# Patient Record
(demographics unavailable — no encounter records)

---

## 2025-04-07 NOTE — HEALTH RISK ASSESSMENT
[Very Good] : ~his/her~ current health as very good [Good] : ~his/her~  mood as  good [Yes] : Yes [2 - 4 times a month (2 pts)] : 2-4 times a month (2 points) [1 or 2 (0 pts)] : 1 or 2 (0 points) [No] : In the past 12 months have you used drugs other than those required for medical reasons? No [No falls in past year] : Patient reported no falls in the past year [0] : 2) Feeling down, depressed, or hopeless: Not at all (0) [PHQ-2 Negative - No further assessment needed] : PHQ-2 Negative - No further assessment needed [Alone] : lives alone [Employed] : employed [College] : College [Single] : single [Never] : Never [Never (0 pts)] : Never (0 points) [Patient reported mammogram was normal] : Patient reported mammogram was normal [Patient reported colonoscopy was normal] : Patient reported colonoscopy was normal [HIV test declined] : HIV test declined [Hepatitis C test declined] : Hepatitis C test declined [Fully functional (bathing, dressing, toileting, transferring, walking, feeding)] : Fully functional (bathing, dressing, toileting, transferring, walking, feeding) [Fully functional (using the telephone, shopping, preparing meals, housekeeping, doing laundry, using] : Fully functional and needs no help or supervision to perform IADLs (using the telephone, shopping, preparing meals, housekeeping, doing laundry, using transportation, managing medications and managing finances) [Reports normal functional visual acuity (ie: able to read med bottle)] : Reports normal functional visual acuity [Audit-CScore] : 2 [de-identified] : Walk, yoga, rowing machine, Pilates  [de-identified] : Pretty good  [[id number]] : 0 [Change in mental status noted] : No change in mental status noted [Reports changes in hearing] : Reports no changes in hearing [Reports changes in vision] : Reports no changes in vision [Reports changes in dental health] : Reports no changes in dental health [MammogramDate] : 03/24 [PapSmearComments] : next month appt  [ColonoscopyDate] : 12/22 [ColonoscopyComments] : f/u in 12/2032 [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

## 2025-04-07 NOTE — ASSESSMENT
[Vaccines Reviewed] : Immunizations reviewed today. Please see immunization details in the vaccine log within the immunization flowsheet.  [FreeTextEntry1] : 49 year old female presented for an annual physical exam.  routine blood work today, blood collected in the office. history of palpitation: EKG today - NSR; is on atenolol, med refilled.  up to date with screening. will call back with results.

## 2025-04-07 NOTE — HISTORY OF PRESENT ILLNESS
[FreeTextEntry1] : establish care and for an annual physical exam [de-identified] : 49-year-old female presenting to establish care and for an annual physical exam. Last seen by pcp befor COVID.  Has seen Dr. Reed in 2022 for hematochezia and rectal bleeding.  Had EGD and colonoscopy with no active bleeding. [place] [facility] [place] and is in the rise program   PMH: Internal hemorrhoids PSH: None FMH: Father-DM, Arthritis, Heart disease. Mother - Uterine cancer and Breast cancer (diagnosed at 62yo). Sister - Passed away pulmonary embolism ta 48yo; another sister - breast cancer (42yo)   GI: Dr. Reed OB/GYN: Dr. Bell  Cardiologist: Dr. Chavez.  Was seen in 2019 for palpitation and was on atenolol 25 mg. Patient reports she has had enough medicine for last 4 years and has been taking daily.

## 2025-05-12 NOTE — HEALTH RISK ASSESSMENT
[Yes] : Yes [2 - 4 times a month (2 pts)] : 2-4 times a month (2 points) [1 or 2 (0 pts)] : 1 or 2 (0 points) [No falls in past year] : Patient reported no falls in the past year [0] : 2) Feeling down, depressed, or hopeless: Not at all (0) [PHQ-2 Negative - No further assessment needed] : PHQ-2 Negative - No further assessment needed [Never] : Never [Audit-CScore] : 2 [[id number]] : 2

## 2025-05-12 NOTE — END OF VISIT
[Time Spent: ___ minutes] : I have spent [unfilled] minutes of time on the encounter which excludes teaching and separately reported services. Started first trimester

## 2025-05-12 NOTE — HEALTH RISK ASSESSMENT
[Yes] : Yes [2 - 4 times a month (2 pts)] : 2-4 times a month (2 points) [1 or 2 (0 pts)] : 1 or 2 (0 points) [No falls in past year] : Patient reported no falls in the past year [0] : 2) Feeling down, depressed, or hopeless: Not at all (0) [PHQ-2 Negative - No further assessment needed] : PHQ-2 Negative - No further assessment needed [Never] : Never [Audit-CScore] : 2 [[id number]] : 2

## 2025-05-12 NOTE — HISTORY OF PRESENT ILLNESS
[FreeTextEntry8] : 50-year-old female with PMH of obesity, hyperlipidemia, atherosclerosis and palpitation presenting for a follow-up.  She has a family history of heart disease and hyperlipidemia. Patient recently had CT heart with the calcium score of 192 with atherosclerosis.   Lipids: , Trig 140, HDL 58, . She was seen by cardiologist, Dr. Cee, and was recommended statin and further workup with echo stress test and carotid ultrasound which are all scheduled.  Patient is considering the statin but has not started yet. Patient reports having a good balanced diet and exercises regularly but is having difficulty losing weight.  She is also considering Zepbound for weight loss.

## 2025-06-08 NOTE — PHYSICAL EXAM
[Chaperoned Physical Exam] : A chaperone was present in the examining room during all aspects of the physical examination. [FreeTextEntry2] : RADHA Yeager [Appropriately responsive] : appropriately responsive [Alert] : alert [No Acute Distress] : no acute distress [No Lymphadenopathy] : no lymphadenopathy [Regular Rate Rhythm] : regular rate rhythm [No Murmurs] : no murmurs [Clear to Auscultation B/L] : clear to auscultation bilaterally [Soft] : soft [Non-tender] : non-tender [Non-distended] : non-distended [No HSM] : No HSM [No Lesions] : no lesions [No Mass] : no mass [Oriented x3] : oriented x3 [Examination Of The Breasts] : a normal appearance [No Masses] : no breast masses were palpable [Labia Majora] : normal [Labia Minora] : normal [Normal] : normal [Normal Position] : in a normal position [Tenderness] : nontender [Uterine Adnexae] : non-palpable

## 2025-06-08 NOTE — HISTORY OF PRESENT ILLNESS
[FreeTextEntry1] : 49 yo  p0 Patient here for well woman exam.  Still has regular cycles. Total bleeding 5-7 days. HAd once single recent period with a single day of bleeding. Denies classic perimenopause symptoms. Has bump on labia. that does not itch, or hurt, but has grown in size.  Not Sexually active.  Exercises.  HAS weigh loss goals for 20lbs weight loss.   History of palpitations followed by cardiology. Takes atenolol. HAS atherosclerosis. HAS stress test planned.  Started Zepboud 2 weeks ago. OB history: nulligravid  GYN history:  denies fibroids, cyst Pap smear: never Followed by [facility] for breast cancer screening because of significant familial history. Mammogram: 3/24 normal. HAs MRI and Mamogram once a year with twice a year physical exam. With [facility].   Colonoscopy: 12/16/22 internal hemrrhoids, diverticulosis, external hemorrhoids, recall 10 years  Mother had uterine/breast ca. Sister with breast ca. Had genetic testing which was negative.

## 2025-06-08 NOTE — HISTORY OF PRESENT ILLNESS
[FreeTextEntry1] : 51 yo  p0 Patient here for well woman exam.  Still has regular cycles. Total bleeding 5-7 days. HAd once single recent period with a single day of bleeding. Denies classic perimenopause symptoms. Has bump on labia. that does not itch, or hurt, but has grown in size.  Not Sexually active.  Exercises.  HAS weigh loss goals for 20lbs weight loss.   History of palpitations followed by cardiology. Takes atenolol. HAS atherosclerosis. HAS stress test planned.  Started Zepboud 2 weeks ago. OB history: nulligravid  GYN history:  denies fibroids, cyst Pap smear: [place] [facility] [place] because of significant familial history. Mammogram: 3/24 normal. [facility]As MRI and Mamogram once a year with twice a year physical exam. [place] [facility]   Colonoscopy: 12/16/22 internal hemrrhoids, diverticulosis, external hemorrhoids, recall 10 years  Mother had uterine/breast ca. Sister with breast ca. Had genetic testing which was negative.

## 2025-07-03 NOTE — HEALTH RISK ASSESSMENT
[2 - 4 times a month (2 pts)] : 2-4 times a month (2 points) [1 or 2 (0 pts)] : 1 or 2 (0 points) [No falls in past year] : Patient reported no falls in the past year [0] : 2) Feeling down, depressed, or hopeless: Not at all (0) [PHQ-2 Negative - No further assessment needed] : PHQ-2 Negative - No further assessment needed [Never] : Never [Audit-CScore] : 2 [[id number]] : 0

## 2025-07-03 NOTE — HEALTH RISK ASSESSMENT
[2 - 4 times a month (2 pts)] : 2-4 times a month (2 points) [1 or 2 (0 pts)] : 1 or 2 (0 points) [No falls in past year] : Patient reported no falls in the past year [0] : 2) Feeling down, depressed, or hopeless: Not at all (0) [PHQ-2 Negative - No further assessment needed] : PHQ-2 Negative - No further assessment needed [Never] : Never [Audit-CScore] : 2 [[id number]] : 0

## 2025-07-03 NOTE — HISTORY OF PRESENT ILLNESS
[FreeTextEntry1] : Follow up  [de-identified] : 50-year-old female with PMH of obesity, hyperlipidemia (not on meds), atherosclerosis and palpitation presenting for a follow-up. Patient is on zepbound 2.5mg and reports to be doing well. She's lost 8lbs. Reports to dieting and exercising. No other symptoms or concerns.

## 2025-07-03 NOTE — HISTORY OF PRESENT ILLNESS
[FreeTextEntry1] : Follow up  [de-identified] : 50-year-old female with PMH of obesity, hyperlipidemia (not on meds), atherosclerosis and palpitation presenting for a follow-up. Patient is on zepbound 2.5mg and reports to be doing well. She's lost 8lbs. Reports to dieting and exercising. No other symptoms or concerns.